# Patient Record
Sex: MALE | Race: WHITE | HISPANIC OR LATINO | Employment: FULL TIME | URBAN - METROPOLITAN AREA
[De-identification: names, ages, dates, MRNs, and addresses within clinical notes are randomized per-mention and may not be internally consistent; named-entity substitution may affect disease eponyms.]

---

## 2020-02-16 ENCOUNTER — HOSPITAL ENCOUNTER (EMERGENCY)
Facility: HOSPITAL | Age: 35
Discharge: HOME | End: 2020-02-16
Attending: EMERGENCY MEDICINE

## 2020-02-16 VITALS
BODY MASS INDEX: 31.16 KG/M2 | HEIGHT: 65 IN | HEART RATE: 76 BPM | SYSTOLIC BLOOD PRESSURE: 114 MMHG | WEIGHT: 187 LBS | DIASTOLIC BLOOD PRESSURE: 73 MMHG | OXYGEN SATURATION: 99 % | TEMPERATURE: 99 F | RESPIRATION RATE: 18 BRPM

## 2020-02-16 DIAGNOSIS — R52 PAIN: ICD-10-CM
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
## 2020-02-16 DIAGNOSIS — T14.8XXA MUSCLE STRAIN: ICD-10-CM

## 2020-02-16 PROCEDURE — 29505 APPLICATION LONG LEG SPLINT: CPT | Mod: RT,ER

## 2020-02-16 PROCEDURE — 99283 EMERGENCY DEPT VISIT LOW MDM: CPT | Mod: 25,ER

## 2020-02-16 RX ORDER — TRAMADOL HYDROCHLORIDE AND ACETAMINOPHEN 37.5; 325 MG/1; MG/1
1 TABLET, FILM COATED ORAL EVERY 6 HOURS PRN
Qty: 9 TABLET | Refills: 0 | Status: SHIPPED | OUTPATIENT
Start: 2020-02-16

## 2020-02-16 NOTE — ED NOTES
Pt transferred from ambulance to stretcher with c-collar and backboard in place. Dr. Lantigua at the bedside. Pt log-rolled while maintaining c-spine immobilization. Pt cleared by Dr. Lantigua.

## 2020-02-16 NOTE — ED PROVIDER NOTES
Chief Complaint  Chief Complaint   Patient presents with    Fall     Pt fell 10ft through roof of house. Pt states that he landed on his legs. Pt now complaining of right knee and lower back pain. Denies LOC.       HPI  Domenic Nino is a 34 y.o. male who presents with pain to his right knee.  Patient reports that he was standing on the roof of a house that was approximately 10 ft off the ground.  The beams holding that portion of the roof collapsed causing the patient to fall directly beneath him.  As he fell he landed on his feet and did not hit the ground.  Nothing hit his head.  He denies any loss of consciousness.  He does report some pain diffusely.  He denies any numbness or tingling or loss of motion.  He denies any seizures or confusion or vomiting.  His pain is moderate and worsened with movement and relieved by rest partially.  He was given pain medicines by EMS.    Past medical history  History reviewed. No pertinent past medical history.    Current Medications  No current facility-administered medications for this encounter.     Current Outpatient Medications:     naproxen (NAPROSYN) 500 MG tablet, Take 1 tablet (500 mg total) by mouth 2 (two) times daily with meals., Disp: 30 tablet, Rfl: 0    tramadol-acetaminophen 37.5-325 mg (ULTRACET) 37.5-325 mg Tab, Take 1 tablet by mouth every 6 (six) hours as needed for Pain., Disp: 9 tablet, Rfl: 0    Allergies  Review of patient's allergies indicates:  No Known Allergies    Surgical history  Past Surgical History:   Procedure Laterality Date    KNEE SURGERY         Social history  Social History     Socioeconomic History    Marital status: Single     Spouse name: Not on file    Number of children: Not on file    Years of education: Not on file    Highest education level: Not on file   Occupational History    Not on file   Social Needs    Financial resource strain: Not on file    Food insecurity:     Worry: Not on file     Inability: Not on file  "   Transportation needs:     Medical: Not on file     Non-medical: Not on file   Tobacco Use    Smoking status: Never Smoker   Substance and Sexual Activity    Alcohol use: Not on file     Comment: occ    Drug use: No    Sexual activity: Not on file   Lifestyle    Physical activity:     Days per week: Not on file     Minutes per session: Not on file    Stress: Not on file   Relationships    Social connections:     Talks on phone: Not on file     Gets together: Not on file     Attends Jain service: Not on file     Active member of club or organization: Not on file     Attends meetings of clubs or organizations: Not on file     Relationship status: Not on file   Other Topics Concern    Not on file   Social History Narrative    Not on file       Family History  History reviewed. No pertinent family history.    Review of systems  Constitutional: No fever or weakness.  Eyes: No redness, pain, or discharge.  HENT: No ear pain, no sudden onset headache, no throat pain.  Respiratory: No wheezing, cough, or shortness of breath.  Neurologic: No new focal weakness or sensory changes.  All systems otherwise negative except as noted in ROS and HPI    Physical Exam  Vital signs: /73   Pulse 76   Temp 99 °F (37.2 °C) (Oral)   Resp 18   Ht 5' 5" (1.651 m)   Wt 84.8 kg (187 lb)   SpO2 99%   BMI 31.12 kg/m²   Constitutional: No acute distress.  Well developed, alert, oriented and appropriate.  HENT: Normocephalic, atraumatic. Normal ear, nose, and throat.  Eyes: PERRL, EOMI, normal conjunctiva.  Neck: Normal range of motion, no tenderness; supple.  Respiratory: Nonlabored breathing with normal breath sounds.  Cardiovascular: RRR with no pulse deficit.  GI: Soft, nontender, no rebound or guarding.  Musculoskeletal: Normal ROM, mild paraspinal tenderness to the neck.  No bony tenderness but will x-ray cautiously given the mechanism.  Re-examined after negative x-rays shows no bony tenderness or step-off or " crepitus.  C-spine cleared by me  Skin:  Small abrasion to right knee.  Neurologic: Normal motor, sensation with no new focal deficit.  Psychiatric: Affect normal, judgement normal, mood normal.  No SI, HI, and not gravely disabled.    Labs  Pertinent labs reviewed (see chart for details)  Labs Reviewed - No data to display    ECG    ECG interpreted by ED MD    Radiology  X-Ray Knee 1 or 2 View Right   Final Result      No acute findings.         Electronically signed by: José Luis Jesus Jr., MD   Date:    02/16/2020   Time:    17:00      X-Ray Cervical Spine AP And Lateral   Final Result      No acute abnormality.         Electronically signed by: Sonido Pope   Date:    02/16/2020   Time:    16:02          Procedures  Procedures    Medications - No data to display    ED course and medical decision making         Patient observed and re-evaluated closely and thoroughly multiple times.  No signs of spinal compromise or compression.  No intracranial hemorrhage or skull fracture.  Patient is smiling and laughing and feels comfortable at this time.  We suggested 1 day off for recuperation but patient does not appear to have any life-threatening or limb-threatening condition.    Disposition    Patient discharged in stable condition      Final impression  1. Fall, initial encounter    2. Pain    3. Muscle strain        Critical care time spent with this patient was 0 minutes excluding the procedure time.          Alfred Lantigua MD  02/16/20 [id number]

## 2023-03-20 ENCOUNTER — OCCUPATIONAL HEALTH (OUTPATIENT)
Dept: URGENT CARE | Facility: CLINIC | Age: 38
End: 2023-03-20

## 2023-03-20 DIAGNOSIS — Z13.9 ENCOUNTER FOR SCREENING: Primary | ICD-10-CM

## 2023-03-20 DIAGNOSIS — Z02.1 PRE-EMPLOYMENT EXAMINATION: Primary | ICD-10-CM

## 2023-03-20 LAB — BREATH ALCOHOL: 0

## 2023-03-20 PROCEDURE — 80305 OOH COLLECTION ONLY DRUG SCREEN: ICD-10-PCS | Mod: S$GLB,,,

## 2023-03-20 PROCEDURE — 82075 POCT BREATH ALCOHOL TEST: ICD-10-PCS | Mod: S$GLB,,,

## 2023-03-20 PROCEDURE — 80305 DRUG TEST PRSMV DIR OPT OBS: CPT | Mod: S$GLB,,,

## 2023-03-20 PROCEDURE — 82075 ASSAY OF BREATH ETHANOL: CPT | Mod: S$GLB,,,

## 2023-03-20 PROCEDURE — 92552 OSHA QUESTIONNAIRE: ICD-10-PCS | Mod: S$GLB,,, | Performed by: PHYSICIAN ASSISTANT

## 2023-03-20 PROCEDURE — 92552 PURE TONE AUDIOMETRY AIR: CPT | Mod: S$GLB,,, | Performed by: PHYSICIAN ASSISTANT

## 2023-03-20 PROCEDURE — 99499 PHYSICAL, BASIC COMPLEXITY: ICD-10-PCS | Mod: S$GLB,,, | Performed by: PHYSICIAN ASSISTANT

## 2023-03-20 PROCEDURE — 99499 UNLISTED E&M SERVICE: CPT | Mod: S$GLB,,, | Performed by: PHYSICIAN ASSISTANT
